# Patient Record
Sex: MALE | Race: WHITE | Employment: FULL TIME | URBAN - NONMETROPOLITAN AREA
[De-identification: names, ages, dates, MRNs, and addresses within clinical notes are randomized per-mention and may not be internally consistent; named-entity substitution may affect disease eponyms.]

---

## 2023-10-15 ENCOUNTER — APPOINTMENT (OUTPATIENT)
Dept: GENERAL RADIOLOGY | Age: 33
End: 2023-10-15
Payer: COMMERCIAL

## 2023-10-15 ENCOUNTER — HOSPITAL ENCOUNTER (EMERGENCY)
Age: 33
Discharge: HOME | End: 2023-10-15
Attending: STUDENT IN AN ORGANIZED HEALTH CARE EDUCATION/TRAINING PROGRAM
Payer: COMMERCIAL

## 2023-10-15 VITALS
RESPIRATION RATE: 17 BRPM | TEMPERATURE: 98.1 F | WEIGHT: 270 LBS | HEART RATE: 96 BPM | SYSTOLIC BLOOD PRESSURE: 176 MMHG | DIASTOLIC BLOOD PRESSURE: 86 MMHG | BODY MASS INDEX: 37.8 KG/M2 | HEIGHT: 71 IN | OXYGEN SATURATION: 98 %

## 2023-10-15 DIAGNOSIS — S82.892A CLOSED FRACTURE OF LEFT ANKLE, INITIAL ENCOUNTER: Primary | ICD-10-CM

## 2023-10-15 PROCEDURE — 73610 X-RAY EXAM OF ANKLE: CPT

## 2023-10-15 PROCEDURE — 99283 EMERGENCY DEPT VISIT LOW MDM: CPT

## 2023-10-15 PROCEDURE — 73630 X-RAY EXAM OF FOOT: CPT

## 2023-10-15 PROCEDURE — 29515 APPLICATION SHORT LEG SPLINT: CPT

## 2023-10-15 RX ORDER — HYDROCODONE BITARTRATE AND ACETAMINOPHEN 5; 325 MG/1; MG/1
1 TABLET ORAL EVERY 6 HOURS PRN
Qty: 12 TABLET | Refills: 0 | Status: SHIPPED | OUTPATIENT
Start: 2023-10-15 | End: 2023-10-18

## 2023-10-15 ASSESSMENT — PAIN DESCRIPTION - LOCATION: LOCATION: ANKLE

## 2023-10-15 ASSESSMENT — PAIN - FUNCTIONAL ASSESSMENT: PAIN_FUNCTIONAL_ASSESSMENT: 0-10

## 2023-10-15 ASSESSMENT — PAIN DESCRIPTION - ORIENTATION: ORIENTATION: LEFT

## 2023-10-15 ASSESSMENT — PAIN SCALES - GENERAL: PAINLEVEL_OUTOF10: 4

## 2023-10-15 NOTE — DISCHARGE INSTRUCTIONS
You were seen at [facility] emergency department for left ankle pain. X-ray showed that you do have a small fracture on the inner side of your left ankle. A splint was applied in the emergency department. Please leave the splint in place, continue use your crutches and do not bear weight on your ankle until you are evaluated at the orthopedic institute of [address] [place] [address]. They have walk-in clinic that opens at 8 AM, please go there at 8 AM tomorrow.

## 2023-10-15 NOTE — ED NOTES
Presents to ED with complaints of worsening left ankle pain. Pt states he was on vacation and felt he twisted his ankle in the sand. States he has been unable to put weight on his foot since Friday.      Ayo, [address] [facility] [address] Luana DESHPANDE, RN  10/15/23 6621

## 2024-05-21 NOTE — PATIENT INSTRUCTIONS
Your  Nurses  Today:  Mary     Your Provider for Today: Dr. Britt          You may receive a survey regarding the care you received during your visit.  Your input is valuable to us.  We encourage you to complete and return your survey.  We hope you will choose us in the future for your healthcare needs.

## 2024-05-22 ENCOUNTER — OFFICE VISIT (OUTPATIENT)
Dept: CARDIOLOGY CLINIC | Age: 34
End: 2024-05-22
Payer: COMMERCIAL

## 2024-05-22 VITALS
HEART RATE: 64 BPM | DIASTOLIC BLOOD PRESSURE: 83 MMHG | SYSTOLIC BLOOD PRESSURE: 138 MMHG | WEIGHT: 237.8 LBS | HEIGHT: 71 IN | BODY MASS INDEX: 33.29 KG/M2

## 2024-05-22 DIAGNOSIS — I20.9 ANGINA PECTORIS (HCC): ICD-10-CM

## 2024-05-22 DIAGNOSIS — I10 HYPERTENSION, UNSPECIFIED TYPE: Primary | ICD-10-CM

## 2024-05-22 DIAGNOSIS — R07.9 CHEST PAIN, UNSPECIFIED TYPE: ICD-10-CM

## 2024-05-22 PROCEDURE — 99204 OFFICE O/P NEW MOD 45 MIN: CPT | Performed by: INTERNAL MEDICINE

## 2024-05-22 PROCEDURE — 3075F SYST BP GE 130 - 139MM HG: CPT | Performed by: INTERNAL MEDICINE

## 2024-05-22 PROCEDURE — 93000 ELECTROCARDIOGRAM COMPLETE: CPT | Performed by: INTERNAL MEDICINE

## 2024-05-22 PROCEDURE — 3079F DIAST BP 80-89 MM HG: CPT | Performed by: INTERNAL MEDICINE

## 2024-05-22 RX ORDER — SUCRALFATE 1 G/1
1 TABLET ORAL 2 TIMES DAILY
COMMUNITY
Start: 2024-04-29

## 2024-05-22 NOTE — PROGRESS NOTES
NP referral for HTN  Reports having EGD which showed liver bile. Started on Carafate. Reports chest pain that will go into his back. Reports carafate helps some.   Denies SOB or palpitations.   EKG completed today in office. Reports losing weight due to diet/exercise changes.

## 2024-05-22 NOTE — PROGRESS NOTES
[facility] [place] [facility]  [facility] CARDIOLOGY  [address]  [address]  [facility] [address]  Dept: [phone]  Dept Fax: [phone]  Loc: [phone]    Visit Date: 5/22/2024    Mr. Santillan is a 33 y.o. male  who presented for:  New referral  ?Hypertension   Chest pain  HPI:   HPI   Nate Santillan is a pleasant 33 year old male patient who has PMH of hypertension. He denies h/o tobacco abuse, alcohol or drug abuse. He works as installing cath lab equipment. His family history is positive for hypertension in his father. His maternal grandfather had MI in his 60s. The patient was recently evaluated at his PCP office and was referred for hypertension. He is currently not taking BP medications.  Patient was previously evaluated at at [facility] in 1/2024 for chest pain. ACS was ruled out. He was noted to have high blood pressure, which then resolved. Chest pains are intermittent, left sided, not radiating, pressure like, up to 3/10 in severity, mostly occur at rest, not associated with sweating or SOB. Patient denies dyspnea on exertion, syncope.     No current outpatient medications on file.    Past Medical History  Nate  has no past medical history on file.  Htn    Social History  See HPI     Family History  See HPI    Past Surgical History   Hernia repair     Review of Systems   Constitutional: Negative for chills and fever  HENT: Negative for congestion, sinus pressure, sneezing and sore throat.    Eyes: Negative for pain, discharge, redness and itching.   Respiratory: Negative for apnea, cough  Gastrointestinal: Negative for blood in stool, constipation, diarrhea   Endocrine: Negative for cold intolerance, heat intolerance, polydipsia.  Genitourinary: Negative for dysuria, enuresis, flank pain and hematuria.   Musculoskeletal: Negative for arthralgias, joint swelling and neck pain.   Neurological: Negative for numbness and headaches.   Psychiatric/Behavioral: Negative

## 2024-06-05 ENCOUNTER — TELEPHONE (OUTPATIENT)
Dept: CARDIOLOGY CLINIC | Age: 34
End: 2024-06-05

## 2024-06-05 ENCOUNTER — HOSPITAL ENCOUNTER (OUTPATIENT)
Age: 34
Discharge: HOME | End: 2024-06-07
Attending: INTERNAL MEDICINE
Payer: COMMERCIAL

## 2024-06-05 VITALS
WEIGHT: 237 LBS | HEIGHT: 71 IN | SYSTOLIC BLOOD PRESSURE: 138 MMHG | DIASTOLIC BLOOD PRESSURE: 83 MMHG | BODY MASS INDEX: 33.18 KG/M2

## 2024-06-05 DIAGNOSIS — R07.9 CHEST PAIN, UNSPECIFIED TYPE: ICD-10-CM

## 2024-06-05 DIAGNOSIS — I20.9 ANGINA PECTORIS (HCC): ICD-10-CM

## 2024-06-05 LAB
ECHO AO ASC DIAM: 2.8 CM
ECHO AO ASCENDING AORTA INDEX: 1.23 CM/M2
ECHO AO SINUS VALSALVA DIAM: 3.1 CM
ECHO AO SINUS VALSALVA INDEX: 1.37 CM/M2
ECHO AO ST JNCT DIAM: 2.7 CM
ECHO AV CUSP MM: 2.2 CM
ECHO AV MEAN GRADIENT: 4 MMHG
ECHO AV MEAN VELOCITY: 0.9 M/S
ECHO AV PEAK GRADIENT: 8 MMHG
ECHO AV PEAK VELOCITY: 1.4 M/S
ECHO AV VELOCITY RATIO: 0.71
ECHO AV VTI: 27.5 CM
ECHO BSA: 2.32 M2
ECHO BSA: 2.32 M2
ECHO EST RA PRESSURE: 5 MMHG
ECHO LA AREA 2C: 15.8 CM2
ECHO LA AREA 4C: 16.4 CM2
ECHO LA DIAMETER INDEX: 1.59 CM/M2
ECHO LA DIAMETER: 3.6 CM
ECHO LA MAJOR AXIS: 5.4 CM
ECHO LA MINOR AXIS: 5 CM
ECHO LA VOL BP: 41 ML (ref 18–58)
ECHO LA VOL MOD A2C: 41 ML (ref 18–58)
ECHO LA VOL MOD A4C: 38 ML (ref 18–58)
ECHO LA VOL/BSA BIPLANE: 18 ML/M2 (ref 16–34)
ECHO LA VOLUME INDEX MOD A2C: 18 ML/M2 (ref 16–34)
ECHO LA VOLUME INDEX MOD A4C: 17 ML/M2 (ref 16–34)
ECHO LV E' LATERAL VELOCITY: 16 CM/S
ECHO LV E' SEPTAL VELOCITY: 11 CM/S
ECHO LV FRACTIONAL SHORTENING: 37 % (ref 28–44)
ECHO LV INTERNAL DIMENSION DIASTOLE INDEX: 2.16 CM/M2
ECHO LV INTERNAL DIMENSION DIASTOLIC: 4.9 CM (ref 4.2–5.9)
ECHO LV INTERNAL DIMENSION SYSTOLIC INDEX: 1.37 CM/M2
ECHO LV INTERNAL DIMENSION SYSTOLIC: 3.1 CM
ECHO LV ISOVOLUMETRIC RELAXATION TIME (IVRT): 71 MS
ECHO LV IVSD: 1 CM (ref 0.6–1)
ECHO LV MASS 2D: 176 G (ref 88–224)
ECHO LV MASS INDEX 2D: 77.5 G/M2 (ref 49–115)
ECHO LV POSTERIOR WALL DIASTOLIC: 1 CM (ref 0.6–1)
ECHO LV RELATIVE WALL THICKNESS RATIO: 0.41
ECHO LVOT AV VTI INDEX: 0.68
ECHO LVOT MEAN GRADIENT: 2 MMHG
ECHO LVOT PEAK GRADIENT: 4 MMHG
ECHO LVOT PEAK VELOCITY: 1 M/S
ECHO LVOT VTI: 18.8 CM
ECHO MV A VELOCITY: 0.68 M/S
ECHO MV E DECELERATION TIME (DT): 270 MS
ECHO MV E VELOCITY: 1.11 M/S
ECHO MV E/A RATIO: 1.63
ECHO MV E/E' LATERAL: 6.94
ECHO MV E/E' RATIO (AVERAGED): 8.51
ECHO MV E/E' SEPTAL: 10.09
ECHO PV MAX VELOCITY: 1.1 M/S
ECHO PV PEAK GRADIENT: 5 MMHG
ECHO PVEIN A DURATION: 116 MS
ECHO PVEIN A VELOCITY: 0.3 M/S
ECHO PVEIN PEAK D VELOCITY: 0.6 M/S
ECHO PVEIN PEAK S VELOCITY: 0.7 M/S
ECHO PVEIN S/D RATIO: 1.2 NO UNITS
ECHO RIGHT VENTRICULAR SYSTOLIC PRESSURE (RVSP): 23 MMHG
ECHO RV FREE WALL PEAK S': 15 CM/S
ECHO RV INTERNAL DIMENSION: 3.1 CM
ECHO RV TAPSE: 2.4 CM (ref 1.7–?)
ECHO TV E WAVE: 0.7 M/S
ECHO TV REGURGITANT MAX VELOCITY: 2.11 M/S
ECHO TV REGURGITANT PEAK GRADIENT: 18 MMHG
STRESS BASELINE DIAS BP: 75 MMHG
STRESS BASELINE HR: 72 BPM
STRESS BASELINE SYS BP: 130 MMHG
STRESS ESTIMATED WORKLOAD: 12.8 METS
STRESS EXERCISE DUR MIN: 10 MIN
STRESS EXERCISE DUR SEC: 0 SEC
STRESS PEAK DIAS BP: 90 MMHG
STRESS PEAK SYS BP: 200 MMHG
STRESS PERCENT HR ACHIEVED: 91 %
STRESS POST PEAK HR: 170 BPM
STRESS RATE PRESSURE PRODUCT: NORMAL BPM*MMHG
STRESS STAGE 1 BP: NORMAL MMHG
STRESS STAGE 1 DURATION: 3 MIN:SEC
STRESS STAGE 1 HR: 117 BPM
STRESS STAGE 2 BP: NORMAL MMHG
STRESS STAGE 2 DURATION: 3 MIN:SEC
STRESS STAGE 2 HR: 138 BPM
STRESS STAGE 3 BP: NORMAL MMHG
STRESS STAGE 3 DURATION: 3 MIN:SEC
STRESS STAGE 3 HR: 160 BPM
STRESS STAGE 4 DURATION: 1 MIN:SEC
STRESS STAGE 4 HR: 170 BPM
STRESS STAGE RECOVERY 1 BP: NORMAL MMHG
STRESS STAGE RECOVERY 1 DURATION: 1 MIN:SEC
STRESS STAGE RECOVERY 1 HR: 170 BPM
STRESS STAGE RECOVERY 2 BP: NORMAL MMHG
STRESS STAGE RECOVERY 2 DURATION: 1 MIN:SEC
STRESS STAGE RECOVERY 2 HR: 112 BPM
STRESS STAGE RECOVERY 4 BP: NORMAL MMHG
STRESS STAGE RECOVERY 4 DURATION: 3 MIN:SEC
STRESS STAGE RECOVERY 4 HR: 99 BPM
STRESS TARGET HR: 187 BPM

## 2024-06-05 PROCEDURE — 93017 CV STRESS TEST TRACING ONLY: CPT

## 2024-06-05 PROCEDURE — 93306 TTE W/DOPPLER COMPLETE: CPT

## 2024-06-05 NOTE — TELEPHONE ENCOUNTER
----- Message from Madelaine Britt MD sent at 6/5/2024  1:39 PM EDT -----  Normal EF  No significant abnormality  Inform patient

## 2024-10-24 ENCOUNTER — HOSPITAL ENCOUNTER (EMERGENCY)
Age: 34
Discharge: HOME | End: 2024-10-24
Attending: STUDENT IN AN ORGANIZED HEALTH CARE EDUCATION/TRAINING PROGRAM
Payer: COMMERCIAL

## 2024-10-24 VITALS
WEIGHT: 237 LBS | RESPIRATION RATE: 16 BRPM | TEMPERATURE: 97.9 F | OXYGEN SATURATION: 97 % | BODY MASS INDEX: 33.05 KG/M2 | DIASTOLIC BLOOD PRESSURE: 74 MMHG | HEART RATE: 73 BPM | SYSTOLIC BLOOD PRESSURE: 126 MMHG

## 2024-10-24 DIAGNOSIS — I49.3 PVC'S (PREMATURE VENTRICULAR CONTRACTIONS): ICD-10-CM

## 2024-10-24 DIAGNOSIS — R00.2 PALPITATIONS: Primary | ICD-10-CM

## 2024-10-24 LAB
ALBUMIN SERPL BCG-MCNC: 4.2 G/DL (ref 3.5–5.1)
ALP SERPL-CCNC: 65 U/L (ref 38–126)
ALT SERPL W/O P-5'-P-CCNC: 15 U/L (ref 11–66)
ANION GAP SERPL CALC-SCNC: 11 MEQ/L (ref 8–16)
AST SERPL-CCNC: 18 U/L (ref 5–40)
BASOPHILS ABSOLUTE: 0 THOU/MM3 (ref 0–0.1)
BASOPHILS NFR BLD AUTO: 0.5 %
BILIRUB CONJ SERPL-MCNC: 0.2 MG/DL (ref 0.1–13.8)
BILIRUB SERPL-MCNC: 0.7 MG/DL (ref 0.3–1.2)
BUN SERPL-MCNC: 13 MG/DL (ref 7–22)
CALCIUM SERPL-MCNC: 9.6 MG/DL (ref 8.5–10.5)
CHLORIDE SERPL-SCNC: 106 MEQ/L (ref 98–111)
CO2 SERPL-SCNC: 26 MEQ/L (ref 23–33)
CREAT SERPL-MCNC: 0.9 MG/DL (ref 0.4–1.2)
DEPRECATED RDW RBC AUTO: 41.2 FL (ref 35–45)
EKG ATRIAL RATE: 76 BPM
EKG P AXIS: 20 DEGREES
EKG P-R INTERVAL: 170 MS
EKG Q-T INTERVAL: 398 MS
EKG QRS DURATION: 96 MS
EKG QTC CALCULATION (BAZETT): 447 MS
EKG R AXIS: 86 DEGREES
EKG T AXIS: 73 DEGREES
EKG VENTRICULAR RATE: 76 BPM
EOSINOPHIL NFR BLD AUTO: 3.1 %
EOSINOPHILS ABSOLUTE: 0.2 THOU/MM3 (ref 0–0.4)
ERYTHROCYTE [DISTWIDTH] IN BLOOD BY AUTOMATED COUNT: 12.7 % (ref 11.5–14.5)
GFR SERPL CREATININE-BSD FRML MDRD: > 90 ML/MIN/1.73M2
GLUCOSE SERPL-MCNC: 109 MG/DL (ref 70–108)
HCT VFR BLD AUTO: 45.7 % (ref 42–52)
HGB BLD-MCNC: 15.5 GM/DL (ref 14–18)
IMM GRANULOCYTES # BLD AUTO: 0.01 THOU/MM3 (ref 0–0.07)
IMM GRANULOCYTES NFR BLD AUTO: 0.2 %
LYMPHOCYTES ABSOLUTE: 1.7 THOU/MM3 (ref 1–4.8)
LYMPHOCYTES NFR BLD AUTO: 29.8 %
MAGNESIUM SERPL-MCNC: 2.1 MG/DL (ref 1.6–2.4)
MCH RBC QN AUTO: 30.6 PG (ref 26–33)
MCHC RBC AUTO-ENTMCNC: 33.9 GM/DL (ref 32.2–35.5)
MCV RBC AUTO: 90.1 FL (ref 80–94)
MONOCYTES ABSOLUTE: 0.4 THOU/MM3 (ref 0.4–1.3)
MONOCYTES NFR BLD AUTO: 7.7 %
NEUTROPHILS ABSOLUTE: 3.4 THOU/MM3 (ref 1.8–7.7)
NEUTROPHILS NFR BLD AUTO: 58.7 %
NRBC BLD AUTO-RTO: 0 /100 WBC
OSMOLALITY SERPL CALC.SUM OF ELEC: 285.7 MOSMOL/KG (ref 275–300)
PLATELET # BLD AUTO: 223 THOU/MM3 (ref 130–400)
PMV BLD AUTO: 11.4 FL (ref 9.4–12.4)
POTASSIUM SERPL-SCNC: 4.3 MEQ/L (ref 3.5–5.2)
PROT SERPL-MCNC: 7 G/DL (ref 6.1–8)
RBC # BLD AUTO: 5.07 MILL/MM3 (ref 4.7–6.1)
SODIUM SERPL-SCNC: 143 MEQ/L (ref 135–145)
T4 FREE SERPL-MCNC: 1.31 NG/DL (ref 0.93–1.68)
TROPONIN, HIGH SENSITIVITY: 11 NG/L (ref 0–12)
TSH SERPL DL<=0.005 MIU/L-ACNC: 1.53 UIU/ML (ref 0.4–4.2)
WBC # BLD AUTO: 5.8 THOU/MM3 (ref 4.8–10.8)

## 2024-10-24 PROCEDURE — 84439 ASSAY OF FREE THYROXINE: CPT

## 2024-10-24 PROCEDURE — 84484 ASSAY OF TROPONIN QUANT: CPT

## 2024-10-24 PROCEDURE — 82248 BILIRUBIN DIRECT: CPT

## 2024-10-24 PROCEDURE — 84443 ASSAY THYROID STIM HORMONE: CPT

## 2024-10-24 PROCEDURE — 93005 ELECTROCARDIOGRAM TRACING: CPT

## 2024-10-24 PROCEDURE — 36415 COLL VENOUS BLD VENIPUNCTURE: CPT

## 2024-10-24 PROCEDURE — 93010 ELECTROCARDIOGRAM REPORT: CPT | Performed by: NUCLEAR MEDICINE

## 2024-10-24 PROCEDURE — 83735 ASSAY OF MAGNESIUM: CPT

## 2024-10-24 PROCEDURE — 80053 COMPREHEN METABOLIC PANEL: CPT

## 2024-10-24 PROCEDURE — 85025 COMPLETE CBC W/AUTO DIFF WBC: CPT

## 2024-10-24 PROCEDURE — 99284 EMERGENCY DEPT VISIT MOD MDM: CPT

## 2024-10-24 ASSESSMENT — PAIN DESCRIPTION - PAIN TYPE: TYPE: ACUTE PAIN

## 2024-10-24 ASSESSMENT — PAIN - FUNCTIONAL ASSESSMENT
PAIN_FUNCTIONAL_ASSESSMENT: 0-10
PAIN_FUNCTIONAL_ASSESSMENT: NONE - DENIES PAIN

## 2024-10-24 ASSESSMENT — PAIN DESCRIPTION - FREQUENCY: FREQUENCY: CONTINUOUS

## 2024-10-24 ASSESSMENT — PAIN SCALES - GENERAL: PAINLEVEL_OUTOF10: 3

## 2024-10-24 ASSESSMENT — PAIN DESCRIPTION - ORIENTATION: ORIENTATION: LEFT

## 2024-10-24 ASSESSMENT — PAIN DESCRIPTION - DESCRIPTORS: DESCRIPTORS: ACHING

## 2024-10-24 ASSESSMENT — PAIN DESCRIPTION - LOCATION: LOCATION: NECK

## 2024-10-24 NOTE — ED PROVIDER NOTES
[facility] EMERGENCY DEPT  EMERGENCY DEPARTMENT ENCOUNTER          Pt Name: Nate Santillan  MRN: [id number]  Birthdate 1990  Date of evaluation: 10/24/2024  Physician: Nieves eDe MD  Supervising Attending Physician: Neno Magallon DO       CHIEF COMPLAINT       Chief Complaint   Patient presents with    Palpitations         HISTORY OF PRESENT ILLNESS    HPI  Nate Santillan is a 34 y.o. male who presents to the emergency department from home, by private vehicle for evaluation of palpitations    Patient presents to the ED reporting palpitations described as extra beats or skipped beats that have been continuous for the past 12 hours.  He denies any current chest pain or shortness of breath.  He has recently had a evaluation of the heart with a stress test and echo did not reveal any acute findings.  He tried to walk in the cardiology clinic but was deferred to the ED.  The patient has no other acute complaints at this time.      REVIEW OF SYSTEMS   Review of Systems      PAST MEDICAL AND SURGICAL HISTORY   History reviewed. No pertinent past medical history.  History reviewed. No pertinent surgical history.      MEDICATIONS   No current facility-administered medications for this encounter.    Current Outpatient Medications:     sucralfate (CARAFATE) 1 GM tablet, Take 1 tablet by mouth in the morning and at bedtime, Disp: , Rfl:     Previous Medications    SUCRALFATE (CARAFATE) 1 GM TABLET    Take 1 tablet by mouth in the morning and at bedtime         SOCIAL HISTORY     Social History     Social History Narrative    Not on file     Social History     Tobacco Use    Smoking status: Never    Smokeless tobacco: Never   Vaping Use    Vaping status: Never Used   Substance Use Topics    Alcohol use: Never    Drug use: Never         ALLERGIES   No Known Allergies      FAMILY HISTORY   History reviewed. No pertinent family history.      PHYSICAL EXAM     ED Triage Vitals [10/24/24 0828]   BP  Systolic BP Percentile Diastolic BP Percentile Temp Temp Source Pulse Respirations SpO2   138/77 -- -- 97.9 °F (36.6 °C) Oral 72 16 97 %      Height Weight - Scale         -- 107.5 kg (237 lb)           Initial vital signs and nursing assessment reviewed and normal. Body mass index is 33.05 kg/m².     Additional Vital Signs:  Vitals:    10/24/24 0958   BP: 126/74   Pulse: 73   Resp: 16   Temp:    SpO2: 97%       Physical Exam  Constitutional:       General: He is not in acute distress.     Appearance: Normal appearance. He is normal weight. He is not ill-appearing.   HENT:      Head: Normocephalic and atraumatic.      Right Ear: External ear normal.      Left Ear: External ear normal.      Nose: Nose normal.      Mouth/Throat:      Mouth: Mucous membranes are moist.      Pharynx: No oropharyngeal exudate or posterior oropharyngeal erythema.   Eyes:      Extraocular Movements: Extraocular movements intact.      Conjunctiva/sclera: Conjunctivae normal.      Pupils: Pupils are equal, round, and reactive to light.   Cardiovascular:      Rate and Rhythm: Normal rate and regular rhythm.      Pulses: Normal pulses.      Heart sounds: No murmur heard.  Pulmonary:      Effort: Pulmonary effort is normal.      Breath sounds: Normal breath sounds.   Abdominal:      General: Abdomen is flat.      Palpations: Abdomen is soft.   Musculoskeletal:      Right lower leg: No edema.      Left lower leg: No edema.   Skin:     General: Skin is warm and dry.      Capillary Refill: Capillary refill takes less than 2 seconds.   Neurological:      General: No focal deficit present.      Mental Status: He is alert and oriented to person, place, and time. Mental status is at baseline.      Cranial Nerves: No cranial nerve deficit.      Motor: No weakness.         ED RESULTS   Laboratory results (none if blank):  Labs Reviewed   BASIC METABOLIC PANEL - Abnormal; Notable for the following components:       Result Value    Glucose 109 (*)     All

## 2024-10-24 NOTE — DISCHARGE INSTR - COC
Elimination:  Continence:   Bowel: {YES / NO:}  Bladder: {YES / NO:}  Urinary Catheter: {Urinary Catheter:807231157}   Colostomy/Ileostomy/Ileal Conduit: {YES / NO:}       Date of Last BM: ***  No intake or output data in the 24 hours ending 10/24/24 1114  No intake/output data recorded.    Safety Concerns:     { DEVEN Safety Concerns:718948385}    Impairments/Disabilities:      { DEVEN Impairments/Disabilities:481035968}    Nutrition Therapy:  Current Nutrition Therapy:   { DEVEN Diet List:136315378}    Routes of Feeding: {[facility] Other Feedings:017316846}  Liquids: {Slp liquid thickness:57155}  Daily Fluid Restriction: {[facility] Yes amt example:188513977}  Last Modified Barium Swallow with Video (Video Swallowing Test): {Done Not Done Date:788500307}    Treatments at the Time of Hospital Discharge:   Respiratory Treatments: ***  Oxygen Therapy:  {Therapy; copd oxygen:31190}  Ventilator:    { CC Vent List:225599983}    Rehab Therapies: {THERAPEUTIC INTERVENTION:0199761699}  Weight Bearing Status/Restrictions: {[facility] Weight Bearin}  Other Medical Equipment (for information only, NOT a DME order):  {EQUIPMENT:422728419}  Other Treatments: ***    Patient's personal belongings (please select all that are sent with patient):  {[facility] Belongings:320232523}    RN SIGNATURE:  {Esignature:029162980}    CASE MANAGEMENT/SOCIAL WORK SECTION    Inpatient Status Date: ***    Readmission Risk Assessment Score:  Readmission Risk              Risk of Unplanned Readmission:  0           Discharging to Facility/ Agency   Name:   Address:  Phone:  Fax:    Dialysis Facility (if applicable)   Name:  Address:  Dialysis Schedule:  Phone:  Fax:    / signature: {Esignature:538490376}    PHYSICIAN SECTION    Prognosis: {Prognosis:5903142367}    Condition at Discharge: { Patient Condition:242151489}    Rehab Potential (if transferring to Rehab): {Prognosis:1667329819}    Recommended

## 2024-10-24 NOTE — DISCHARGE INSTRUCTIONS
Follow-up with your cardiologist for further assessment and management of your symptoms.    Return to the emergency department immediately if there is any new or concerning symptom.

## 2024-10-25 ENCOUNTER — HOSPITAL ENCOUNTER (OUTPATIENT)
Age: 34
Discharge: HOME | End: 2024-10-27
Attending: NURSE PRACTITIONER
Payer: COMMERCIAL

## 2024-10-25 ENCOUNTER — OFFICE VISIT (OUTPATIENT)
Dept: CARDIOLOGY CLINIC | Age: 34
End: 2024-10-25
Payer: COMMERCIAL

## 2024-10-25 VITALS
HEART RATE: 70 BPM | SYSTOLIC BLOOD PRESSURE: 140 MMHG | WEIGHT: 233 LBS | RESPIRATION RATE: 18 BRPM | DIASTOLIC BLOOD PRESSURE: 82 MMHG | BODY MASS INDEX: 32.62 KG/M2 | OXYGEN SATURATION: 97 % | HEIGHT: 71 IN

## 2024-10-25 DIAGNOSIS — R06.02 SHORTNESS OF BREATH: ICD-10-CM

## 2024-10-25 DIAGNOSIS — R07.89 CHEST TIGHTNESS: ICD-10-CM

## 2024-10-25 DIAGNOSIS — R00.2 PALPITATIONS: ICD-10-CM

## 2024-10-25 DIAGNOSIS — R00.2 PALPITATIONS: Primary | ICD-10-CM

## 2024-10-25 LAB — ECHO BSA: 2.3 M2

## 2024-10-25 PROCEDURE — 99214 OFFICE O/P EST MOD 30 MIN: CPT | Performed by: NURSE PRACTITIONER

## 2024-10-25 PROCEDURE — 93270 REMOTE 30 DAY ECG REV/REPORT: CPT

## 2024-10-25 NOTE — PATIENT INSTRUCTIONS
May continue the [place].    Wear the event monitor as prescribed.     Stay as active as you can.     Eat heart healthy diet.     Follow-up with your PCP as scheduled.    Follow-up with Dr. Britt in 3 months as scheduled or sooner if need.

## 2024-10-25 NOTE — PROGRESS NOTES
[facility] [place] [facility]  [facility] CARDIOLOGY  [address]  [address]  [facility] [address]  Dept: [phone]  Dept Fax: [phone]  Loc: [phone]    Visit Date: 10/25/2024    Mr. Santillan is a 34 y.o. male  who presented for: ED follow-up    Primary Cardiologist: Madelaine Britt MD    HPI:   HPI   ED visit 10/24/24  Per ED note:  Nate Santillan is a 34 y.o. male who presents to the emergency department from home, by private vehicle for evaluation of palpitations  Patient presents to the ED reporting palpitations described as extra beats or skipped beats that have been continuous for the past 12 hours.  He denies any current chest pain or shortness of breath.  He has recently had a evaluation of the heart with a stress test and echo did not reveal any acute findings.  He tried to walk in the cardiology clinic but was deferred to the ED.  The patient has no other acute complaints at this time.  /77; HR 72; EKG SR 70s; Trop 11    Last seen in office on 5/22/24 per Dr. Soto. Per office note:  Nate Santillan is a pleasant 33 year old male patient who has PMH of hypertension. He denies h/o tobacco abuse, alcohol or drug abuse. He works as installing cath lab equipment. His family history is positive for hypertension in his father. His maternal grandfather had MI in his 60s. The patient was recently evaluated at his PCP office and was referred for hypertension. He is currently not taking BP medications.  Patient was previously evaluated at at [facility] in 1/2024 for chest pain. ACS was ruled out. He was noted to have high blood pressure, which then resolved. Chest pains are intermittent, left sided, not radiating, pressure like, up to 3/10 in severity, mostly occur at rest, not associated with sweating or SOB. Patient denies dyspnea on exertion, syncope.   AssessmentPlan:      Chest pain  Post ER visit 1/2024  ?Hypertension   FH of CAD      Patient was previously

## 2024-11-27 LAB — ECHO BSA: 2.3 M2
